# Patient Record
Sex: MALE | Race: WHITE | NOT HISPANIC OR LATINO | Employment: OTHER | URBAN - METROPOLITAN AREA
[De-identification: names, ages, dates, MRNs, and addresses within clinical notes are randomized per-mention and may not be internally consistent; named-entity substitution may affect disease eponyms.]

---

## 2021-03-20 ENCOUNTER — APPOINTMENT (EMERGENCY)
Dept: CT IMAGING | Facility: HOSPITAL | Age: 66
End: 2021-03-20
Payer: COMMERCIAL

## 2021-03-20 ENCOUNTER — HOSPITAL ENCOUNTER (EMERGENCY)
Facility: HOSPITAL | Age: 66
End: 2021-03-20
Attending: EMERGENCY MEDICINE | Admitting: EMERGENCY MEDICINE
Payer: COMMERCIAL

## 2021-03-20 ENCOUNTER — APPOINTMENT (EMERGENCY)
Dept: RADIOLOGY | Facility: HOSPITAL | Age: 66
End: 2021-03-20
Payer: COMMERCIAL

## 2021-03-20 ENCOUNTER — HOSPITAL ENCOUNTER (OUTPATIENT)
Facility: HOSPITAL | Age: 66
Setting detail: OBSERVATION
Discharge: HOME | End: 2021-03-21
Attending: SURGERY | Admitting: SURGERY
Payer: COMMERCIAL

## 2021-03-20 VITALS
SYSTOLIC BLOOD PRESSURE: 162 MMHG | DIASTOLIC BLOOD PRESSURE: 88 MMHG | TEMPERATURE: 98.3 F | OXYGEN SATURATION: 97 % | WEIGHT: 190 LBS | RESPIRATION RATE: 41 BRPM | HEART RATE: 70 BPM

## 2021-03-20 DIAGNOSIS — R07.81 RIB PAIN: ICD-10-CM
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
## 2021-03-20 DIAGNOSIS — R10.9 RIGHT SIDED ABDOMINAL PAIN: ICD-10-CM

## 2021-03-20 DIAGNOSIS — R07.89 CHEST WALL PAIN: ICD-10-CM

## 2021-03-20 DIAGNOSIS — S22.39XA RIB FRACTURE: Primary | ICD-10-CM

## 2021-03-20 LAB
ANION GAP SERPL CALCULATED.3IONS-SCNC: 13 MMOL/L (ref 4–13)
APTT PPP: 25 SECONDS (ref 23–37)
BASOPHILS # BLD AUTO: 0 THOUSANDS/ΜL (ref 0–0.1)
BASOPHILS NFR BLD AUTO: 1 % (ref 0–2)
BUN SERPL-MCNC: 21 MG/DL (ref 7–25)
CALCIUM SERPL-MCNC: 8.8 MG/DL (ref 8.6–10.5)
CHLORIDE SERPL-SCNC: 102 MMOL/L (ref 98–107)
CO2 SERPL-SCNC: 22 MMOL/L (ref 21–31)
CREAT SERPL-MCNC: 1.48 MG/DL (ref 0.7–1.3)
EOSINOPHIL # BLD AUTO: 0 THOUSAND/ΜL (ref 0–0.61)
EOSINOPHIL NFR BLD AUTO: 0 % (ref 0–5)
ERYTHROCYTE [DISTWIDTH] IN BLOOD BY AUTOMATED COUNT: 13.5 % (ref 11.5–14.5)
GFR SERPL CREATININE-BSD FRML MDRD: 49 ML/MIN/1.73SQ M
GLUCOSE SERPL-MCNC: 166 MG/DL (ref 65–99)
HCT VFR BLD AUTO: 40 % (ref 42–47)
HGB BLD-MCNC: 14.1 G/DL (ref 14–18)
LYMPHOCYTES # BLD AUTO: 1 THOUSANDS/ΜL (ref 0.6–4.47)
LYMPHOCYTES NFR BLD AUTO: 10 % (ref 21–51)
MCH RBC QN AUTO: 33.7 PG (ref 26–34)
MCHC RBC AUTO-ENTMCNC: 35.2 G/DL (ref 31–37)
MCV RBC AUTO: 96 FL (ref 81–99)
MONOCYTES # BLD AUTO: 1.1 THOUSAND/ΜL (ref 0.17–1.22)
MONOCYTES NFR BLD AUTO: 12 % (ref 2–12)
NEUTROPHILS # BLD AUTO: 7.2 THOUSANDS/ΜL (ref 1.4–6.5)
NEUTS SEG NFR BLD AUTO: 77 % (ref 42–75)
PLATELET # BLD AUTO: 205 THOUSANDS/UL (ref 149–390)
PMV BLD AUTO: 7.9 FL (ref 8.6–11.7)
POTASSIUM SERPL-SCNC: 3.7 MMOL/L (ref 3.5–5.5)
RBC # BLD AUTO: 4.17 MILLION/UL (ref 4.3–5.9)
SODIUM SERPL-SCNC: 137 MMOL/L (ref 134–143)
TROPONIN I SERPL-MCNC: <0.03 NG/ML
WBC # BLD AUTO: 9.4 THOUSAND/UL (ref 4.8–10.8)

## 2021-03-20 PROCEDURE — 71260 CT THORAX DX C+: CPT

## 2021-03-20 PROCEDURE — 96375 TX/PRO/DX INJ NEW DRUG ADDON: CPT

## 2021-03-20 PROCEDURE — 99285 EMERGENCY DEPT VISIT HI MDM: CPT | Performed by: EMERGENCY MEDICINE

## 2021-03-20 PROCEDURE — 74177 CT ABD & PELVIS W/CONTRAST: CPT

## 2021-03-20 PROCEDURE — 84484 ASSAY OF TROPONIN QUANT: CPT | Performed by: EMERGENCY MEDICINE

## 2021-03-20 PROCEDURE — 93005 ELECTROCARDIOGRAM TRACING: CPT

## 2021-03-20 PROCEDURE — 96374 THER/PROPH/DIAG INJ IV PUSH: CPT

## 2021-03-20 PROCEDURE — 36415 COLL VENOUS BLD VENIPUNCTURE: CPT | Performed by: EMERGENCY MEDICINE

## 2021-03-20 PROCEDURE — 99219 PR INITIAL OBSERVATION CARE/DAY 50 MINUTES: CPT | Performed by: SURGERY

## 2021-03-20 PROCEDURE — 85025 COMPLETE CBC W/AUTO DIFF WBC: CPT | Performed by: EMERGENCY MEDICINE

## 2021-03-20 PROCEDURE — 99285 EMERGENCY DEPT VISIT HI MDM: CPT

## 2021-03-20 PROCEDURE — 80048 BASIC METABOLIC PNL TOTAL CA: CPT | Performed by: EMERGENCY MEDICINE

## 2021-03-20 PROCEDURE — 70450 CT HEAD/BRAIN W/O DYE: CPT

## 2021-03-20 PROCEDURE — 96376 TX/PRO/DX INJ SAME DRUG ADON: CPT

## 2021-03-20 PROCEDURE — 72125 CT NECK SPINE W/O DYE: CPT

## 2021-03-20 PROCEDURE — 85730 THROMBOPLASTIN TIME PARTIAL: CPT | Performed by: EMERGENCY MEDICINE

## 2021-03-20 PROCEDURE — 71045 X-RAY EXAM CHEST 1 VIEW: CPT

## 2021-03-20 RX ORDER — ONDANSETRON 2 MG/ML
4 INJECTION INTRAMUSCULAR; INTRAVENOUS EVERY 6 HOURS PRN
Status: DISCONTINUED | OUTPATIENT
Start: 2021-03-20 | End: 2021-03-21 | Stop reason: HOSPADM

## 2021-03-20 RX ORDER — HYDROMORPHONE HCL/PF 1 MG/ML
0.5 SYRINGE (ML) INJECTION
Status: DISCONTINUED | OUTPATIENT
Start: 2021-03-20 | End: 2021-03-21 | Stop reason: HOSPADM

## 2021-03-20 RX ORDER — OXYCODONE HYDROCHLORIDE 5 MG/1
5 TABLET ORAL EVERY 4 HOURS PRN
Status: DISCONTINUED | OUTPATIENT
Start: 2021-03-20 | End: 2021-03-21 | Stop reason: HOSPADM

## 2021-03-20 RX ORDER — FENTANYL CITRATE 50 UG/ML
50 INJECTION, SOLUTION INTRAMUSCULAR; INTRAVENOUS ONCE
Status: COMPLETED | OUTPATIENT
Start: 2021-03-20 | End: 2021-03-20

## 2021-03-20 RX ORDER — ACETAMINOPHEN 325 MG/1
975 TABLET ORAL EVERY 8 HOURS SCHEDULED
Status: DISCONTINUED | OUTPATIENT
Start: 2021-03-20 | End: 2021-03-21 | Stop reason: HOSPADM

## 2021-03-20 RX ORDER — NICOTINE 21 MG/24HR
1 PATCH, TRANSDERMAL 24 HOURS TRANSDERMAL DAILY
Status: DISCONTINUED | OUTPATIENT
Start: 2021-03-21 | End: 2021-03-21 | Stop reason: HOSPADM

## 2021-03-20 RX ORDER — GABAPENTIN 300 MG/1
300 CAPSULE ORAL
Status: DISCONTINUED | OUTPATIENT
Start: 2021-03-20 | End: 2021-03-21 | Stop reason: HOSPADM

## 2021-03-20 RX ORDER — METHOCARBAMOL 500 MG/1
500 TABLET, FILM COATED ORAL EVERY 6 HOURS SCHEDULED
Status: DISCONTINUED | OUTPATIENT
Start: 2021-03-20 | End: 2021-03-21 | Stop reason: HOSPADM

## 2021-03-20 RX ORDER — LIDOCAINE 50 MG/G
1 PATCH TOPICAL DAILY
Status: DISCONTINUED | OUTPATIENT
Start: 2021-03-21 | End: 2021-03-21 | Stop reason: HOSPADM

## 2021-03-20 RX ORDER — SENNOSIDES 8.6 MG
2 TABLET ORAL DAILY
Status: DISCONTINUED | OUTPATIENT
Start: 2021-03-21 | End: 2021-03-21 | Stop reason: HOSPADM

## 2021-03-20 RX ORDER — HYDROMORPHONE HCL/PF 1 MG/ML
0.5 SYRINGE (ML) INJECTION ONCE
Status: COMPLETED | OUTPATIENT
Start: 2021-03-20 | End: 2021-03-20

## 2021-03-20 RX ORDER — HYDROMORPHONE HCL/PF 1 MG/ML
1 SYRINGE (ML) INJECTION ONCE
Status: COMPLETED | OUTPATIENT
Start: 2021-03-20 | End: 2021-03-20

## 2021-03-20 RX ORDER — OXYCODONE HYDROCHLORIDE 10 MG/1
10 TABLET ORAL EVERY 4 HOURS PRN
Status: DISCONTINUED | OUTPATIENT
Start: 2021-03-20 | End: 2021-03-21 | Stop reason: HOSPADM

## 2021-03-20 RX ADMIN — METHOCARBAMOL 500 MG: 500 TABLET, FILM COATED ORAL at 17:42

## 2021-03-20 RX ADMIN — HYDROMORPHONE HYDROCHLORIDE 0.5 MG: 1 INJECTION, SOLUTION INTRAMUSCULAR; INTRAVENOUS; SUBCUTANEOUS at 20:03

## 2021-03-20 RX ADMIN — HYDROMORPHONE HYDROCHLORIDE 1 MG: 1 INJECTION, SOLUTION INTRAMUSCULAR; INTRAVENOUS; SUBCUTANEOUS at 14:36

## 2021-03-20 RX ADMIN — ENOXAPARIN SODIUM 30 MG: 30 INJECTION SUBCUTANEOUS at 19:11

## 2021-03-20 RX ADMIN — ACETAMINOPHEN 975 MG: 325 TABLET, FILM COATED ORAL at 17:41

## 2021-03-20 RX ADMIN — FENTANYL CITRATE 50 MCG: 50 INJECTION INTRAMUSCULAR; INTRAVENOUS at 13:41

## 2021-03-20 RX ADMIN — FENTANYL CITRATE 50 MCG: 50 INJECTION INTRAMUSCULAR; INTRAVENOUS at 13:45

## 2021-03-20 RX ADMIN — GABAPENTIN 300 MG: 300 CAPSULE ORAL at 22:24

## 2021-03-20 RX ADMIN — ACETAMINOPHEN 975 MG: 325 TABLET, FILM COATED ORAL at 22:24

## 2021-03-20 RX ADMIN — METHOCARBAMOL 500 MG: 500 TABLET, FILM COATED ORAL at 22:24

## 2021-03-20 RX ADMIN — IOHEXOL 100 ML: 350 INJECTION, SOLUTION INTRAVENOUS at 13:09

## 2021-03-20 RX ADMIN — OXYCODONE HYDROCHLORIDE 10 MG: 10 TABLET ORAL at 23:29

## 2021-03-20 RX ADMIN — FENTANYL CITRATE 50 MCG: 50 INJECTION INTRAMUSCULAR; INTRAVENOUS at 14:27

## 2021-03-20 RX ADMIN — HYDROMORPHONE HYDROCHLORIDE 0.5 MG: 1 INJECTION, SOLUTION INTRAMUSCULAR; INTRAVENOUS; SUBCUTANEOUS at 15:44

## 2021-03-20 RX ADMIN — ONDANSETRON 4 MG: 2 INJECTION INTRAMUSCULAR; INTRAVENOUS at 20:09

## 2021-03-20 RX ADMIN — OXYCODONE HYDROCHLORIDE 10 MG: 10 TABLET ORAL at 19:11

## 2021-03-20 NOTE — RESPIRATORY THERAPY NOTE
RT Protocol Note  Jocelynn Louder 76 y o  male MRN: [id number]  Unit/Bed#: ED 01 Encounter: [id number]    Assessment    Active Problems:    * No active hospital problems  *      Home Pulmonary Medications:  na       Past Medical History:   Diagnosis Date    Ruptured, tendon, Achilles     Shoulder injury      Social History     Socioeconomic History    Marital status: /Civil Union     Spouse name: None    Number of children: None    Years of education: None    Highest education level: None   Occupational History    None   Social Needs    Financial resource strain: None    Food insecurity     Worry: None     Inability: None    Transportation needs     Medical: None     Non-medical: None   Tobacco Use    Smoking status: Current Some Day Smoker    Smokeless tobacco: Never Used   Substance and Sexual Activity    Alcohol use: Yes    Drug use: Never    Sexual activity: None   Lifestyle    Physical activity     Days per week: None     Minutes per session: None    Stress: None   Relationships    Social connections     Talks on phone: None     Gets together: None     Attends [employer] service: None     Active member of club or organization: None     Attends meetings of clubs or organizations: None     Relationship status: None    Intimate partner violence     Fear of current or ex partner: None     Emotionally abused: None     Physically abused: None     Forced sexual activity: None   Other Topics Concern    None   Social History Narrative    None       Subjective         Objective    Physical Exam:   Assessment Type: (P) Assess only  General Appearance: (P) Alert, Awake  Respiratory Pattern: (P) Normal  Chest Assessment: (P) Chest expansion symmetrical  Bilateral Breath Sounds: (P) Clear  Cough: (P) None  O2 Device: (P) na    Vitals:  Blood pressure 159/90, pulse 71, temperature 97 9 °F (36 6 °C), temperature source Oral, resp  rate 20, SpO2 98 %            Imaging and other studies: I have personally reviewed pertinent reports        O2 Device: (P) na     Plan       Airway Clearance Plan: (P) Incentive Spirometer     Resp Comments: (P) pt instructed on IS, tolerated well, will continue to monitor per protocol
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
## 2021-03-20 NOTE — H&P
H&P Exam - Trauma   Darius Briceno 76 y o  male MRN: [id number]  Unit/Bed#: ED 01 Encounter: [id number]    Assessment/Plan   Trauma Alert: Other Transfer  Model of Arrival: Ambulance  Trauma Team: Attending Trey Ramos and Residents Check  Consultants: None    Trauma Active Problems:   Skiing accident  Chest wall pain  Right-sided abdominal pain    Trauma Plan:   CT head, C-spine, CAP performed prior to transfer  Admit to med surg  Analgesia  Serial exams    Chief Complaint:  Right upper quadrant pain    History of Present Illness   HPI:  Darius Briceno is a 76 y o  male with no significant past medical history presents as a transfer from [facility] for intractable pain  Patient was skiing when he was struck by another skier  He was wearing his helmet  Denies any loss of consciousness  He was complaining primarily of right-sided abdominal pain and chest wall pain  CT scan demonstrated slightly bowed appeared to the right anterior 2nd rib without fracture line seen  Given tachypnea and requirement of IV narcotics, patient transferred here for further management and care  He denies any headache, vision changes, neck or back pain  No shortness of breath, abdominal pain, nausea or vomiting  Mechanism:Fall    Review of Systems    12-point, complete review of systems was reviewed and negative except as stated above         Historical Information     Past Medical History:   Diagnosis Date    Ruptured, tendon, Achilles     Shoulder injury      Past Surgical History:   Procedure Laterality Date    ACHILLES TENDON REPAIR      THYROIDECTOMY, PARTIAL       Social History   Social History     Substance and Sexual Activity   Alcohol Use Yes     Social History     Substance and Sexual Activity   Drug Use Never     Social History     Tobacco Use   Smoking Status Current Some Day Smoker   Smokeless Tobacco Never Used     E-Cigarette/Vaping    E-Cigarette Use Never User      E-Cigarette/Vaping Substances There is no immunization history on file for this patient  Family History: Non-contributory        Meds/Allergies   all current active meds have been reviewed    No Known Allergies      PHYSICAL EXAM    Objective   Vitals:   First set: Temperature: 97 9 °F (36 6 °C) (03/20/21 1655)  Pulse: 71 (03/20/21 1655)  Respirations: 20 (03/20/21 1655)  Blood Pressure: 159/90 (03/20/21 1655)    Primary Survey:   (A) Airway:  Intact  (B) Breathing:  Bilateral breath sounds, equal chest rise  (C) Circulation: Pulses:   normal  (D) Disabliity:  GCS Total:  15  (E) Expose:  Completed    Secondary Survey: (Click on Physical Exam tab above)  Physical Exam  Vitals signs and nursing note reviewed  Constitutional:       Appearance: He is well-developed  HENT:      Head: Normocephalic and atraumatic  Right Ear: External ear normal       Left Ear: External ear normal       Nose: Nose normal       Mouth/Throat:      Mouth: Mucous membranes are moist    Eyes:      Extraocular Movements: Extraocular movements intact  Conjunctiva/sclera: Conjunctivae normal       Pupils: Pupils are equal, round, and reactive to light  Neck:      Musculoskeletal: Normal range of motion and neck supple  Comments: No neck tenderness  Cardiovascular:      Rate and Rhythm: Normal rate and regular rhythm  Heart sounds: No murmur  Comments: Right chest wall tenderness  Pulmonary:      Effort: Pulmonary effort is normal  No respiratory distress  Breath sounds: Normal breath sounds  Abdominal:      General: Bowel sounds are normal       Palpations: Abdomen is soft  Tenderness: There is abdominal tenderness (Right upper quadrant)  Musculoskeletal: Normal range of motion  General: No tenderness or deformity  Comments: No midline back tenderness, no step-offs   Skin:     General: Skin is warm and dry  Capillary Refill: Capillary refill takes less than 2 seconds     Neurological:      General: No focal deficit present  Mental Status: He is alert and oriented to person, place, and time  Cranial Nerves: No cranial nerve deficit  Sensory: No sensory deficit  Psychiatric:         Mood and Affect: Mood normal          Behavior: Behavior normal          Invasive Devices     None                 Lab Results:   BMP/CMP:   Lab Results   Component Value Date    SODIUM 137 03/20/2021    K 3 7 03/20/2021     03/20/2021    CO2 22 03/20/2021    BUN 21 03/20/2021    CREATININE 1 48 (H) 03/20/2021    CALCIUM 8 8 03/20/2021    EGFR 49 03/20/2021    and CBC:   Lab Results   Component Value Date    WBC 9 40 03/20/2021    HGB 14 1 03/20/2021    HCT 40 0 (L) 03/20/2021    MCV 96 03/20/2021     03/20/2021    MCH 33 7 03/20/2021    MCHC 35 2 03/20/2021    RDW 13 5 03/20/2021    MPV 7 9 (L) 03/20/2021     Imaging/EKG Studies: Results: I have personally reviewed pertinent reports        Code Status:  Full code   Advance Directive and Living Will:      Power of :    POLST:

## 2021-03-21 VITALS
HEART RATE: 52 BPM | BODY MASS INDEX: 28.14 KG/M2 | TEMPERATURE: 98.6 F | WEIGHT: 190 LBS | RESPIRATION RATE: 16 BRPM | OXYGEN SATURATION: 97 % | HEIGHT: 69 IN | DIASTOLIC BLOOD PRESSURE: 82 MMHG | SYSTOLIC BLOOD PRESSURE: 142 MMHG

## 2021-03-21 PROBLEM — R93.89 ABNORMAL FINDING ON CT SCAN: Status: ACTIVE | Noted: 2021-03-21

## 2021-03-21 PROBLEM — R07.81 RIB PAIN: Status: ACTIVE | Noted: 2021-03-21

## 2021-03-21 LAB
ANION GAP SERPL CALCULATED.3IONS-SCNC: 4 MMOL/L (ref 4–13)
ATRIAL RATE: 83 BPM
BASOPHILS # BLD AUTO: 0.02 THOUSANDS/ΜL (ref 0–0.1)
BASOPHILS NFR BLD AUTO: 0 % (ref 0–1)
BUN SERPL-MCNC: 19 MG/DL (ref 5–25)
CALCIUM SERPL-MCNC: 8.5 MG/DL (ref 8.3–10.1)
CHLORIDE SERPL-SCNC: 110 MMOL/L (ref 100–108)
CO2 SERPL-SCNC: 26 MMOL/L (ref 21–32)
CREAT SERPL-MCNC: 1.18 MG/DL (ref 0.6–1.3)
EOSINOPHIL # BLD AUTO: 0 THOUSAND/ΜL (ref 0–0.61)
EOSINOPHIL NFR BLD AUTO: 0 % (ref 0–6)
ERYTHROCYTE [DISTWIDTH] IN BLOOD BY AUTOMATED COUNT: 12.6 % (ref 11.6–15.1)
GFR SERPL CREATININE-BSD FRML MDRD: 64 ML/MIN/1.73SQ M
GLUCOSE SERPL-MCNC: 106 MG/DL (ref 65–140)
HCT VFR BLD AUTO: 37.3 % (ref 36.5–49.3)
HGB BLD-MCNC: 13 G/DL (ref 12–17)
IMM GRANULOCYTES # BLD AUTO: 0.03 THOUSAND/UL (ref 0–0.2)
IMM GRANULOCYTES NFR BLD AUTO: 0 % (ref 0–2)
LYMPHOCYTES # BLD AUTO: 0.85 THOUSANDS/ΜL (ref 0.6–4.47)
LYMPHOCYTES NFR BLD AUTO: 10 % (ref 14–44)
MCH RBC QN AUTO: 33.7 PG (ref 26.8–34.3)
MCHC RBC AUTO-ENTMCNC: 34.9 G/DL (ref 31.4–37.4)
MCV RBC AUTO: 97 FL (ref 82–98)
MONOCYTES # BLD AUTO: 1.26 THOUSAND/ΜL (ref 0.17–1.22)
MONOCYTES NFR BLD AUTO: 14 % (ref 4–12)
NEUTROPHILS # BLD AUTO: 6.69 THOUSANDS/ΜL (ref 1.85–7.62)
NEUTS SEG NFR BLD AUTO: 76 % (ref 43–75)
NRBC BLD AUTO-RTO: 0 /100 WBCS
PLATELET # BLD AUTO: 200 THOUSANDS/UL (ref 149–390)
PMV BLD AUTO: 9.7 FL (ref 8.9–12.7)
POTASSIUM SERPL-SCNC: 3.3 MMOL/L (ref 3.5–5.3)
QRS AXIS: 3 DEGREES
QRSD INTERVAL: 88 MS
QT INTERVAL: 408 MS
QTC INTERVAL: 476 MS
RBC # BLD AUTO: 3.86 MILLION/UL (ref 3.88–5.62)
SODIUM SERPL-SCNC: 140 MMOL/L (ref 136–145)
T WAVE AXIS: -24 DEGREES
VENTRICULAR RATE: 82 BPM
WBC # BLD AUTO: 8.85 THOUSAND/UL (ref 4.31–10.16)

## 2021-03-21 PROCEDURE — 94760 N-INVAS EAR/PLS OXIMETRY 1: CPT

## 2021-03-21 PROCEDURE — 85025 COMPLETE CBC W/AUTO DIFF WBC: CPT | Performed by: EMERGENCY MEDICINE

## 2021-03-21 PROCEDURE — 97162 PT EVAL MOD COMPLEX 30 MIN: CPT

## 2021-03-21 PROCEDURE — 97166 OT EVAL MOD COMPLEX 45 MIN: CPT

## 2021-03-21 PROCEDURE — 80048 BASIC METABOLIC PNL TOTAL CA: CPT | Performed by: EMERGENCY MEDICINE

## 2021-03-21 PROCEDURE — 99217 PR OBSERVATION CARE DISCHARGE MANAGEMENT: CPT | Performed by: PHYSICIAN ASSISTANT

## 2021-03-21 PROCEDURE — NC001 PR NO CHARGE: Performed by: SURGERY

## 2021-03-21 PROCEDURE — 93010 ELECTROCARDIOGRAM REPORT: CPT | Performed by: INTERNAL MEDICINE

## 2021-03-21 RX ORDER — METHOCARBAMOL 500 MG/1
500 TABLET, FILM COATED ORAL EVERY 6 HOURS SCHEDULED
Qty: 45 TABLET | Refills: 0 | Status: SHIPPED | OUTPATIENT
Start: 2021-03-21

## 2021-03-21 RX ORDER — DOCUSATE SODIUM 100 MG/1
100 CAPSULE, LIQUID FILLED ORAL 2 TIMES DAILY
Qty: 10 CAPSULE | Refills: 0 | Status: SHIPPED | OUTPATIENT
Start: 2021-03-21

## 2021-03-21 RX ORDER — GABAPENTIN 300 MG/1
300 CAPSULE ORAL
Qty: 30 CAPSULE | Refills: 0 | Status: SHIPPED | OUTPATIENT
Start: 2021-03-21

## 2021-03-21 RX ORDER — GABAPENTIN 300 MG/1
300 CAPSULE ORAL
Qty: 30 CAPSULE | Refills: 0 | Status: SHIPPED | OUTPATIENT
Start: 2021-03-21 | End: 2021-03-21

## 2021-03-21 RX ORDER — METHOCARBAMOL 500 MG/1
500 TABLET, FILM COATED ORAL EVERY 6 HOURS SCHEDULED
Qty: 45 TABLET | Refills: 0 | Status: SHIPPED | OUTPATIENT
Start: 2021-03-21 | End: 2021-03-21

## 2021-03-21 RX ORDER — OXYCODONE HYDROCHLORIDE 5 MG/1
TABLET ORAL
Qty: 30 TABLET | Refills: 0 | Status: SHIPPED | OUTPATIENT
Start: 2021-03-21

## 2021-03-21 RX ORDER — SIMETHICONE 80 MG
80 TABLET,CHEWABLE ORAL EVERY 6 HOURS PRN
Status: DISCONTINUED | OUTPATIENT
Start: 2021-03-21 | End: 2021-03-21 | Stop reason: HOSPADM

## 2021-03-21 RX ORDER — ACETAMINOPHEN 325 MG/1
975 TABLET ORAL EVERY 8 HOURS SCHEDULED
Refills: 0
Start: 2021-03-21

## 2021-03-21 RX ORDER — SENNOSIDES 8.6 MG
17.2 TABLET ORAL DAILY
Refills: 0
Start: 2021-03-22

## 2021-03-21 RX ADMIN — ACETAMINOPHEN 975 MG: 325 TABLET, FILM COATED ORAL at 13:50

## 2021-03-21 RX ADMIN — SENNOSIDES 17.2 MG: 8.6 TABLET, FILM COATED ORAL at 08:47

## 2021-03-21 RX ADMIN — SIMETHICONE 80 MG: 80 TABLET, CHEWABLE ORAL at 11:31

## 2021-03-21 RX ADMIN — HYDROMORPHONE HYDROCHLORIDE 0.5 MG: 1 INJECTION, SOLUTION INTRAMUSCULAR; INTRAVENOUS; SUBCUTANEOUS at 00:34

## 2021-03-21 RX ADMIN — ENOXAPARIN SODIUM 30 MG: 30 INJECTION SUBCUTANEOUS at 08:47

## 2021-03-21 RX ADMIN — ACETAMINOPHEN 975 MG: 325 TABLET, FILM COATED ORAL at 05:18

## 2021-03-21 RX ADMIN — METHOCARBAMOL 500 MG: 500 TABLET, FILM COATED ORAL at 05:18

## 2021-03-21 RX ADMIN — METHOCARBAMOL 500 MG: 500 TABLET, FILM COATED ORAL at 11:31

## 2021-03-21 RX ADMIN — LIDOCAINE 1 PATCH: 50 PATCH TOPICAL at 08:46

## 2021-03-21 NOTE — PHYSICAL THERAPY NOTE
Physical Therapy Evaluation    Patient's Name: Nilda Tejeda    Admitting Diagnosis  Rib pain [R07 81]    Problem List  Patient Active Problem List   Diagnosis    Rib pain    Abnormal finding on CT scan       Past Medical History  Past Medical History:   Diagnosis Date    Ruptured, tendon, Achilles     Shoulder injury        Past Surgical History  Past Surgical History:   Procedure Laterality Date    ACHILLES TENDON REPAIR      THYROIDECTOMY, PARTIAL          03/21/21 0954   PT Last Visit   PT Visit Date 03/21/21   Note Type   Note type Evaluation   Pain Assessment   Pain Assessment Tool FLACC   Pain Location/Orientation Orientation: Right;Location: Rib Cage   Hospital Pain Intervention(s) Repositioned; Ambulation/increased activity   Pain Rating: FLACC (Rest) - Face 0   Pain Rating: FLACC (Rest) - Legs 0   Pain Rating: FLACC (Rest) - Activity 0   Pain Rating: FLACC (Rest) - Cry 0   Pain Rating: FLACC (Rest) - Consolability 0   Score: FLACC (Rest) 0   Pain Rating: FLACC (Activity) - Face 1   Pain Rating: FLACC (Activity) - Legs 0   Pain Rating: FLACC (Activity) - Activity 0   Pain Rating: FLACC (Activity) - Cry 0   Pain Rating: FLACC (Activity) - Consolability 0   Score: FLACC (Activity) 1   Home Living   Type of [address] [place] [address] One level;Stairs to enter with rails   Home Equipment Other (Comment)  (none)   Additional Comments Pt lives in a [facility] with 1 ROMERO   Prior Function   Level of [place] Independent with ADLs and functional mobility   Lives With Spouse   ADL Assistance Independent   IADLs Independent   Falls in the last 6 months 0   Vocational Retired   Restrictions/Precautions   [place] Bearing Precautions Per Order No   Other Precautions Pain   Cognition   Overall Cognitive Status WFL   Orientation Level Oriented X4   Memory Within functional limits   Following Commands Follows all commands and directions without difficulty   RLE Assessment   RLE Assessment WFL   LLE Assessment   LLE Assessment WFL   Bed Mobility   Supine to Sit 6  Modified independent   Sit to Supine 6  Modified independent   Transfers   Sit to Stand 5  Supervision   Stand to Sit 5  Supervision   Additional Comments Transfers w/o AD   Ambulation/Elevation   Gait pattern Inconsistent al; Step to   Gait Assistance 5  Supervision   Additional items Assist x 1   Assistive Device None   Distance 360ft   Balance   Static Sitting Good   Dynamic Sitting Fair +   Static Standing Fair   Dynamic Standing [address],Flo[address] 3,4, & 5 -   Activity Tolerance   Activity Tolerance Patient tolerated treatment well   Medical Staff Made Aware OT Tramainebrennen   Nurse Made Aware pt ok to see per RN   Assessment   Prognosis Good   Assessment Pt is a 76 y o  male seen for PT evaluation s/p admit to [facility] on 3/20/2021  Pt was admitted with a primary dx of: rib pain after being struck by a snowboarder  CT scan showed "bowing of his R 2nd rib " CT of head, c-spine, and chest were negative  PT now consulted for assessment of mobility and d/c needs  Pt with Up in chair, PT evaluation orders  Pts current comorbidities effecting treatment include: hx of ruptured achilles and shoulder injury  Social factors impacting patient include: advanced age, stair navigation at home  Pts current clinical presentation is Evolving (medium complexity) due to Ongoing medical management for primary dx, Decreased activity tolerance compared to baseline, Fall risk  Prior to admission, pt was independent with ADLs and ambulating w/o AD  Pt lives with his wife in a [facility] with 1STE  Upon evaluation, pt currently is mod I for bed mobility; supervision for transfers and supervision for ambulation 360 ft w/ no AD  Pt presents at PT eval functioning at/near baseline mobility level, limited mostly by pain  No further acute PT needs, D/C PT  At conclusion of PT session pt returned BTB with phone and call bell within reach  Pt denies any further questions at this time   The patient's AM-PAC Basic Mobility Inpatient Short Form Raw Score is 20, Standardized Score is 43 99  A standardized score greater than 42 9 suggests the patient may benefit from discharge to home  Please also refer to the recommendation of the Physical Therapist for safe discharge planning  Recommend home with social support upon hospital D/C     Barriers to Discharge None   Goals   Patient Goals to go home   Plan   PT Frequency One time visit  (D/C PT)   Recommendation   PT Discharge Recommendation Return to previous environment with social support   PT - OK to Discharge Yes  (when medically cleared)   AM-PAC Basic Mobility Inpatient   Turning in Bed Without Bedrails 4   Lying on Back to Sitting on Edge of Flat Bed 3   Moving Bed to Chair 4   Standing Up From Chair 3   Walk in Room 3   Climb 3-5 Stairs 3   Basic Mobility Inpatient Raw Score 20   Basic Mobility Standardized Score 43 99       Britany Dyson, PT, DPT

## 2021-03-21 NOTE — PLAN OF CARE
Problem: Potential for Falls  Goal: Patient will remain free of falls  Description: INTERVENTIONS:  - Assess patient frequently for physical needs  -  Identify cognitive and physical deficits and behaviors that affect risk of falls    -  [place] fall precautions as indicated by assessment   - Educate patient/family on patient safety including physical limitations  - Instruct patient to call for assistance with activity based on assessment  - Modify environment to reduce risk of injury  - Consider OT/PT consult to assist with strengthening/mobility  Outcome: Progressing     Problem: PAIN - ADULT  Goal: Verbalizes/displays adequate comfort level or baseline comfort level  Description: Interventions:  - Encourage patient to monitor pain and request assistance  - Assess pain using appropriate pain scale  - Administer analgesics based on type and severity of pain and evaluate response  - Implement non-pharmacological measures as appropriate and evaluate response  - Consider cultural and social influences on pain and pain management  - Notify physician/advanced practitioner if interventions unsuccessful or patient reports new pain  Outcome: Progressing     Problem: INFECTION - ADULT  Goal: Absence or prevention of progression during hospitalization  Description: INTERVENTIONS:  - Assess and monitor for signs and symptoms of infection  - Monitor lab/diagnostic results  - Monitor all insertion sites, i e  indwelling lines, tubes, and drains  - Monitor endotracheal if appropriate and nasal secretions for changes in amount and color  - [place] appropriate cooling/warming therapies per order  - Administer medications as ordered  - Instruct and encourage patient and family to use good hand hygiene technique  - Identify and instruct in appropriate isolation precautions for identified infection/condition  Outcome: Progressing  Goal: Absence of fever/infection during neutropenic period  Description: INTERVENTIONS:  - Monitor WBC    Outcome: Progressing     Problem: SAFETY ADULT  Goal: Patient will remain free of falls  Description: INTERVENTIONS:  - Assess patient frequently for physical needs  -  Identify cognitive and physical deficits and behaviors that affect risk of falls    -  [place] fall precautions as indicated by assessment   - Educate patient/family on patient safety including physical limitations  - Instruct patient to call for assistance with activity based on assessment  - Modify environment to reduce risk of injury  - Consider OT/PT consult to assist with strengthening/mobility  Outcome: Progressing  Goal: Maintain or return to baseline ADL function  Description: INTERVENTIONS:  -  Assess patient's ability to carry out ADLs; assess patient's baseline for ADL function and identify physical deficits which impact ability to perform ADLs (bathing, care of mouth/teeth, toileting, grooming, dressing, etc )  - Assess/evaluate cause of self-care deficits   - Assess range of motion  - Assess patient's mobility; develop plan if impaired  - Assess patient's need for assistive devices and provide as appropriate  - Encourage maximum independence but intervene and supervise when necessary  - Involve family in performance of ADLs  - Assess for home care needs following discharge   - Consider OT consult to assist with ADL evaluation and planning for discharge  - Provide patient education as appropriate  Outcome: Progressing  Goal: Maintain or return mobility status to optimal level  Description: INTERVENTIONS:  - Assess patient's baseline mobility status (ambulation, transfers, stairs, etc )    - Identify cognitive and physical deficits and behaviors that affect mobility  - Identify mobility aids required to assist with transfers and/or ambulation (gait belt, sit-to-stand, lift, walker, cane, etc )  - [place] fall precautions as indicated by assessment  - Record patient progress and toleration of activity level on Mobility SBAR; progress patient to next Phase/Stage  - Instruct patient to call for assistance with activity based on assessment  - Consider rehabilitation consult to assist with strengthening/weightbearing, etc   Outcome: Progressing     Problem: PAIN - ADULT  Goal: Verbalizes/displays adequate comfort level or baseline comfort level  Description: Interventions:  - Encourage patient to monitor pain and request assistance  - Assess pain using appropriate pain scale  - Administer analgesics based on type and severity of pain and evaluate response  - Implement non-pharmacological measures as appropriate and evaluate response  - Consider cultural and social influences on pain and pain management  - Notify physician/advanced practitioner if interventions unsuccessful or patient reports new pain  Outcome: Progressing     Problem: DISCHARGE PLANNING  Goal: Discharge to home or other facility with appropriate resources  Description: INTERVENTIONS:  - Identify barriers to discharge w/patient and caregiver  - Arrange for needed discharge resources and transportation as appropriate  - Identify discharge learning needs (meds, wound care, etc )  - Arrange for interpretive services to assist at discharge as needed  - Refer to Case Management Department for coordinating discharge planning if the patient needs post-hospital services based on physician/advanced practitioner order or complex needs related to functional status, cognitive ability, or social support system  Outcome: Progressing     Problem: Knowledge Deficit  Goal: Patient/family/caregiver demonstrates understanding of disease process, treatment plan, medications, and discharge instructions  Description: Complete learning assessment and assess knowledge base    Interventions:  - Provide teaching at level of understanding  - Provide teaching via preferred learning methods  Outcome: Progressing     Problem: RESPIRATORY - ADULT  Goal: Achieves optimal ventilation and oxygenation  Description: INTERVENTIONS:  - Assess for changes in respiratory status  - Assess for changes in mentation and behavior  - Position to facilitate oxygenation and minimize respiratory effort  - Oxygen administered by appropriate delivery if ordered  - Initiate smoking cessation education as indicated  - Encourage broncho-pulmonary hygiene including cough, deep breathe, Incentive Spirometry  - Assess the need for suctioning and aspirate as needed  - Assess and instruct to report SOB or any respiratory difficulty  - Respiratory Therapy support as indicated  Outcome: Progressing     Problem: GASTROINTESTINAL - ADULT  Goal: Minimal or absence of nausea and/or vomiting  Description: INTERVENTIONS:  - Administer IV fluids if ordered to ensure adequate hydration  - Maintain NPO status until nausea and vomiting are resolved  - Nasogastric tube if ordered  - Administer ordered antiemetic medications as needed  - Provide nonpharmacologic comfort measures as appropriate  - Advance diet as tolerated, if ordered  - Consider nutrition services referral to assist patient with adequate nutrition and appropriate food choices  Outcome: Progressing  Goal: Maintains or returns to baseline bowel function  Description: INTERVENTIONS:  - Assess bowel function  - Encourage oral fluids to ensure adequate hydration  - Administer IV fluids if ordered to ensure adequate hydration  - Administer ordered medications as needed  - Encourage mobilization and activity  - Consider nutritional services referral to assist patient with adequate nutrition and appropriate food choices  Outcome: Progressing  Goal: Maintains adequate nutritional intake  Description: INTERVENTIONS:  - Monitor percentage of each meal consumed  - Identify factors contributing to decreased intake, treat as appropriate  - Assist with meals as needed  - Monitor I&O, weight, and lab values if indicated  - Obtain nutrition services referral as needed  Outcome: Progressing

## 2021-03-21 NOTE — PLAN OF CARE
Problem: Potential for Falls  Goal: Patient will remain free of falls  Description: INTERVENTIONS:  - Assess patient frequently for physical needs  -  Identify cognitive and physical deficits and behaviors that affect risk of falls    -  [place] fall precautions as indicated by assessment   - Educate patient/family on patient safety including physical limitations  - Instruct patient to call for assistance with activity based on assessment  - Modify environment to reduce risk of injury  - Consider OT/PT consult to assist with strengthening/mobility  Outcome: Progressing     Problem: PAIN - ADULT  Goal: Verbalizes/displays adequate comfort level or baseline comfort level  Description: Interventions:  - Encourage patient to monitor pain and request assistance  - Assess pain using appropriate pain scale  - Administer analgesics based on type and severity of pain and evaluate response  - Implement non-pharmacological measures as appropriate and evaluate response  - Consider cultural and social influences on pain and pain management  - Notify physician/advanced practitioner if interventions unsuccessful or patient reports new pain  Outcome: Progressing     Problem: INFECTION - ADULT  Goal: Absence or prevention of progression during hospitalization  Description: INTERVENTIONS:  - Assess and monitor for signs and symptoms of infection  - Monitor lab/diagnostic results  - Monitor all insertion sites, i e  indwelling lines, tubes, and drains  - Monitor endotracheal if appropriate and nasal secretions for changes in amount and color  - [place] appropriate cooling/warming therapies per order  - Administer medications as ordered  - Instruct and encourage patient and family to use good hand hygiene technique  - Identify and instruct in appropriate isolation precautions for identified infection/condition  Outcome: Progressing  Goal: Absence of fever/infection during neutropenic period  Description: INTERVENTIONS:  - Monitor WBC    Outcome: Progressing     Problem: SAFETY ADULT  Goal: Patient will remain free of falls  Description: INTERVENTIONS:  - Assess patient frequently for physical needs  -  Identify cognitive and physical deficits and behaviors that affect risk of falls    -  [place] fall precautions as indicated by assessment   - Educate patient/family on patient safety including physical limitations  - Instruct patient to call for assistance with activity based on assessment  - Modify environment to reduce risk of injury  - Consider OT/PT consult to assist with strengthening/mobility  Outcome: Progressing  Goal: Maintain or return to baseline ADL function  Description: INTERVENTIONS:  -  Assess patient's ability to carry out ADLs; assess patient's baseline for ADL function and identify physical deficits which impact ability to perform ADLs (bathing, care of mouth/teeth, toileting, grooming, dressing, etc )  - Assess/evaluate cause of self-care deficits   - Assess range of motion  - Assess patient's mobility; develop plan if impaired  - Assess patient's need for assistive devices and provide as appropriate  - Encourage maximum independence but intervene and supervise when necessary  - Involve family in performance of ADLs  - Assess for home care needs following discharge   - Consider OT consult to assist with ADL evaluation and planning for discharge  - Provide patient education as appropriate  Outcome: Progressing  Goal: Maintain or return mobility status to optimal level  Description: INTERVENTIONS:  - Assess patient's baseline mobility status (ambulation, transfers, stairs, etc )    - Identify cognitive and physical deficits and behaviors that affect mobility  - Identify mobility aids required to assist with transfers and/or ambulation (gait belt, sit-to-stand, lift, walker, cane, etc )  - [place] fall precautions as indicated by assessment  - Record patient progress and toleration of activity level on Mobility SBAR; progress patient to next Phase/Stage  - Instruct patient to call for assistance with activity based on assessment  - Consider rehabilitation consult to assist with strengthening/weightbearing, etc   Outcome: Progressing     Problem: DISCHARGE PLANNING  Goal: Discharge to home or other facility with appropriate resources  Description: INTERVENTIONS:  - Identify barriers to discharge w/patient and caregiver  - Arrange for needed discharge resources and transportation as appropriate  - Identify discharge learning needs (meds, wound care, etc )  - Arrange for interpretive services to assist at discharge as needed  - Refer to Case Management Department for coordinating discharge planning if the patient needs post-hospital services based on physician/advanced practitioner order or complex needs related to functional status, cognitive ability, or social support system  Outcome: Progressing     Problem: Knowledge Deficit  Goal: Patient/family/caregiver demonstrates understanding of disease process, treatment plan, medications, and discharge instructions  Description: Complete learning assessment and assess knowledge base    Interventions:  - Provide teaching at level of understanding  - Provide teaching via preferred learning methods  Outcome: Progressing     Problem: RESPIRATORY - ADULT  Goal: Achieves optimal ventilation and oxygenation  Description: INTERVENTIONS:  - Assess for changes in respiratory status  - Assess for changes in mentation and behavior  - Position to facilitate oxygenation and minimize respiratory effort  - Oxygen administered by appropriate delivery if ordered  - Initiate smoking cessation education as indicated  - Encourage broncho-pulmonary hygiene including cough, deep breathe, Incentive Spirometry  - Assess the need for suctioning and aspirate as needed  - Assess and instruct to report SOB or any respiratory difficulty  - Respiratory Therapy support as indicated  Outcome: Progressing     Problem: GASTROINTESTINAL - ADULT  Goal: Minimal or absence of nausea and/or vomiting  Description: INTERVENTIONS:  - Administer IV fluids if ordered to ensure adequate hydration  - Maintain NPO status until nausea and vomiting are resolved  - Nasogastric tube if ordered  - Administer ordered antiemetic medications as needed  - Provide nonpharmacologic comfort measures as appropriate  - Advance diet as tolerated, if ordered  - Consider nutrition services referral to assist patient with adequate nutrition and appropriate food choices  Outcome: Progressing  Goal: Maintains or returns to baseline bowel function  Description: INTERVENTIONS:  - Assess bowel function  - Encourage oral fluids to ensure adequate hydration  - Administer IV fluids if ordered to ensure adequate hydration  - Administer ordered medications as needed  - Encourage mobilization and activity  - Consider nutritional services referral to assist patient with adequate nutrition and appropriate food choices  Outcome: Progressing  Goal: Maintains adequate nutritional intake  Description: INTERVENTIONS:  - Monitor percentage of each meal consumed  - Identify factors contributing to decreased intake, treat as appropriate  - Assist with meals as needed  - Monitor I&O, weight, and lab values if indicated  - Obtain nutrition services referral as needed  Outcome: Progressing

## 2021-03-21 NOTE — INCIDENTAL FINDINGS
The following findings require follow up:  Radiographic finding   Findin) 3 5 cm homogeneous right adrenal nodule    Although its imaging features are indeterminate, it does not have suspicious imaging features (heterogeneity, necrosis, irregular margins),  but based on its size, a nonemergent dedicated adrenal protocol CT is recommended to confirm benignity  2) Incidental thyroid nodule(s) for which nonemergent thyroid ultrasound is recommended       Follow up required: As above   Follow up should be done within 3 month(s)    Please notify the following clinician to assist with the follow up:   Dr Nick Lopez

## 2021-03-21 NOTE — ASSESSMENT & PLAN NOTE
Patient noted to have mild aneurysmal ascending thoracic aorta, a right adrenal nodule with recommendation for dedicated adrenal protocol CT scan, and thyroid nodules recommended for further evaluation with nonemergent thyroid ultrasound  Patient was informed of these incidental findings are recommended for follow-up as described with family doctor as well

## 2021-03-21 NOTE — INCIDENTAL FINDINGS
The following findings require follow up:  Radiographic finding   Finding: 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  Fatty changes of the liver  Follow up required: family doctor   Follow up should be done within 2 week(s)    Findings discussed with patient

## 2021-03-21 NOTE — PROGRESS NOTES
[address] [facility]  Progress Note - [address] [facility] 1955, 76 y o  male MRN: [id number]  Unit/Bed#: [facility] 610-01 Encounter: [id number]  Primary Care Provider: Mary Torre DO   Date and time admitted to hospital: 3/20/2021  4:45 PM    Rib pain  Assessment & Plan  - 3/20 CT Slightly bowed appearance to the right anterior 2nd rib without fracture line seen, question old fracture but correlate with site of pain  Ribs are otherwise intact  - Rib fracture protocol  - Pain control        TERTIARY TRAUMA SURVEY NOTE    Prophylaxis: Enoxaparin (Lovenox)    Disposition:  Likely discharge if patient has pain control    Code status:  Level 1 - Full Code    Consultants: None    Is the patient 65 years or older?: YES:    1  Before the illness or injury that brought you to the Emergency, did you need someone to help you on a regular basis? 0=No   2  Since the illness or injury that brought you to the Emergency, have you needed more help than usual to take care of yourself? 0=No   3  Have you been hospitalized for one or more nights during the past 6 months (excluding a stay in the Emergency Department)? 0=No   4  In general, do you see well? 0=Yes   5  In general, do you have serious problems with your memory? 0=No   6  Do you take more than three different medications everyday? 0=No   TOTAL   0     Did you order a geriatric consult if the score was 2 or greater?: n/a    Identification of Seniors at Risk      Most Recent Value   (ISAR) Identification of Seniors at Risk   Before the illness or injury that brought you to the Emergency, did you need someone to help you on a regular basis? 0 Filed at: 03/20/2021 1702   In the last 24 hours, have you needed more help than usual?  0 Filed at: 03/20/2021 1702   Have you been hospitalized for one or more nights during the past 6 months?   0 Filed at: 03/20/2021 1702   In general, do you see well?  0 Filed at: 03/20/2021 1702   In general, do you have serious problems with your memory? 0 Filed at: 03/20/2021 1702   Do you take more than three different medications every day?  0 Filed at: 03/20/2021 1702   ISAR Score  0 Filed at: 03/20/2021 1702            SUBJECTIVE:     Transfer from: [facility] [address]  [employer] Received: not applicable  Tertiary Exam Due on: 3/21    Mechanism of Injury:Fall    Details related to Injury: Helmet:  yes    Chief Complaint:  Mild epigastric abdominal pain    HPI/Last 24 hour events:  No acute events overnight  Patient was brought in after a collision while skiing  Complaining of right-sided chest and abdominal pain  He states that his abdominal pain was very severe overnight but has significantly improved and now he just has a generalized discomfort  No nausea/vomiting  No history of prior in the past   Pulling approximately 2000 cc on incentive spirometry      Active medications:           Current Facility-Administered Medications:     acetaminophen (TYLENOL) tablet 975 mg, 975 mg, Oral, Q8H [address], 975 mg at 03/21/21 0518    enoxaparin (LOVENOX) subcutaneous injection 30 mg, 30 mg, Subcutaneous, Q12H, 30 mg at 03/20/21 1911    gabapentin (NEURONTIN) capsule 300 mg, 300 mg, Oral, HS, 300 mg at 03/20/21 2224    HYDROmorphone (DILAUDID) injection 0 5 mg, 0 5 mg, Intravenous, Q1H PRN, 0 5 mg at 03/21/21 0034    lidocaine (LIDODERM) 5 % patch 1 patch, 1 patch, Topical, Daily    methocarbamol (ROBAXIN) tablet 500 mg, 500 mg, Oral, Q6H [address], 500 mg at 03/21/21 0518    naloxone (NARCAN) 0 04 mg/mL syringe 0 04 mg, 0 04 mg, Intravenous, Q1MIN PRN    nicotine (NICODERM CQ) 14 mg/24hr TD 24 hr patch 1 patch, 1 patch, Transdermal, Daily    ondansetron (ZOFRAN) injection 4 mg, 4 mg, Intravenous, Q6H PRN, 4 mg at 03/20/21 2009    oxyCODONE (ROXICODONE) immediate release tablet 10 mg, 10 mg, Oral, Q4H PRN, 10 mg at 03/20/21 7546    oxyCODONE (ROXICODONE) IR tablet 5 mg, 5 mg, Oral, Q4H PRN    senna (SENOKOT) tablet 17 2 mg, 2 tablet, Oral, Daily      OBJECTIVE:     Vitals:   Vitals:    03/20/21 2251   BP: 139/70   Pulse: 67   Resp: 18   Temp: (!) 97 4 °F (36 3 °C)   SpO2: 95%       Physical Exam:    General: VS reviewed  Appears in NAD  awake, alert  Speaking normally in full sentences  Head: Normocephalic, atraumatic  Eyes: EOM-I  No diplopia  Symmetrical lids  ENT: Atraumatic external nose and ears  MMM  No malocclusion  No stridor  Normal phonation  No drooling  Normal swallowing  Neck: No JVD  CV: No pallor noted  Lungs:   No tachypnea  No respiratory distress  Abdomen is soft, nondistended, no tenderness with palpation  MSK:   FROM spontaneously  Skin: Dry, intact  Neuro: Awake, alert, GCS15, CN II-XII grossly intact  Motor grossly intact  Psychiatric/Behavioral: Appropriate mood and affect   Exam: deferred      I/O:   I/O       03/19 0701 - 03/20 0700 03/20 0701 - 03/21 0700    P  O   480    Total Intake(mL/kg)  480 (5 6)    Urine (mL/kg/hr)  600    Emesis/NG output  0    Total Output  600    Net  -120          Unmeasured Emesis Occurrence  2 x          Invasive Devices: Invasive Devices     Peripheral Intravenous Line            Peripheral IV 03/20/21 Right Antecubital less than 1 day                  Imaging:   Trauma - Ct Head Wo Contrast    Result Date: 3/20/2021  Impression: No acute intracranial abnormality  The study was marked in [facility] for immediate notification  Workstation performed: [id number]     Trauma - Ct Spine Cervical Wo Contrast    Result Date: 3/20/2021  Impression: No cervical spine fracture or traumatic malalignment  Incidental thyroid nodule(s) for which nonemergent thyroid ultrasound is recommended  The study was marked in [facility] for immediate notification  Workstation performed: [id number]     Trauma - Ct Chest Abdomen Pelvis W Contrast    Result Date: 3/20/2021  Impression: 1    Slightly bowed appearance to the right anterior 2nd rib without fracture line seen, question old fracture but correlate with site of pain  Ribs are otherwise intact  2   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  3  3 5 cm homogeneous right adrenal nodule    Although its imaging features are indeterminate, it does not have suspicious imaging features (heterogeneity, necrosis, irregular margins),  but based on its size, a nonemergent dedicated adrenal protocol CT is recommended to confirm benignity  The study was marked in [facility] for immediate notification  Workstation performed: [id number]     Ct Recon Only Thoracolumbar (no Charge)    Result Date: 3/20/2021  Impression: 1  No fracture or traumatic subluxation   Workstation performed: [id number]       Labs:   CBC:   Lab Results   Component Value Date    WBC 8 85 03/21/2021    HGB 13 0 03/21/2021    HCT 37 3 03/21/2021    MCV 97 03/21/2021     03/21/2021    MCH 33 7 03/21/2021    MCHC 34 9 03/21/2021    RDW 12 6 03/21/2021    MPV 9 7 03/21/2021    NRBC 0 03/21/2021     CMP:   Lab Results   Component Value Date     (H) 03/21/2021    CO2 26 03/21/2021    BUN 19 03/21/2021    CREATININE 1 18 03/21/2021    CALCIUM 8 5 03/21/2021    EGFR 64 03/21/2021

## 2021-03-21 NOTE — DISCHARGE SUMMARY
[address] [facility]  Discharge- Niya Ibanez 1955, 76 y o  male MRN: [id number]  Unit/Bed#: [facility] 610-01 Encounter: [id number]  Primary Care Provider: Jt Coello DO   Date and time admitted to hospital: 3/20/2021  4:45 PM    * Rib pain  Assessment & Plan  - 3/20 CT Slightly bowed appearance to the right anterior 2nd rib without fracture line seen, question old fracture but correlate with site of pain  Ribs are otherwise intact  - Rib fracture protocol  - Pain control-adequate on current regimen  -no evidence pneumothorax or hemothorax on CT scan of the chest  -continue encourage pulmonary toileting, patient is saturating in the high 90s on room air  -PT and OT have cleared the patient for discharge home  -discharge home today and follow up with Trauma in 2 weeks    Abnormal finding on CT scan  Assessment & Plan  Patient noted to have mild aneurysmal ascending thoracic aorta, a right adrenal nodule with recommendation for dedicated adrenal protocol CT scan, and thyroid nodules recommended for further evaluation with nonemergent thyroid ultrasound  Patient was informed of these incidental findings are recommended for follow-up as described with family doctor as well  Resolved Problems  Date Reviewed: 3/21/2021    None          Admission Date:   Admission Orders (From admission, onward)     Ordered        03/20/21 1720  Place in Observation  Once                     Admitting Diagnosis: Rib pain [R07 81]    HPI: As documented by Dr Srinivas Cadet who evaluated the patient on admission, "Niya Ibanez is a 76 y o  male with no significant past medical history presents as a transfer from [facility] for intractable pain  Patient was skiing when he was struck by another skier  He was wearing his helmet  Denies any loss of consciousness  He was complaining primarily of right-sided abdominal pain and chest wall pain    CT scan demonstrated slightly bowed appeared to the right anterior 2nd rib without fracture line seen  Given tachypnea and requirement of IV narcotics, patient transferred here for further management and care  He denies any headache, vision changes, neck or back pain  No shortness of breath, abdominal pain, nausea or vomiting  Mechanism:Fall"    Procedures Performed: No orders of the defined types were placed in this encounter  Summary of Hospital Course:  Patient was placed on the trauma service following skiing accident  He was found to have bowing of his right 2nd rib on CT scan of the chest without evidence of fracture line  He did have right chest wall pain and abdominal discomfort as well  CT scans of the head, C-spine and chest abdomen and pelvis showed no other acute traumatic injuries  He was placed on rib fracture protocol and observed overnight  He continued to do well from a respiratory standpoint, requiring no supplemental oxygen and breathing comfortably  He is compliant with use of his incentive spirometer  He tolerated a diet and slept well last night  He has been up and mobile with physical therapy who cleared him for discharge home  Patient was noted to have incidental findings as stated above and he was informed of these prior to discharge  States he is sore in his right chest wall and over his abdomen  He has been tolerating a diet without nausea or vomiting  He denies shortness of breath  He is motivated to go home today      Exam:     Vitals:    03/21/21 0705   BP: 142/82   Pulse: (!) 52   Resp: 16   Temp: 98 6 °F (37 °C)   SpO2: 97%     GEN:  No acute distress  HEENT:  Normocephalic, atraumatic  NEURO:  GCS 15, nonfocal  CV:  Regular rate and rhythm, no murmurs gallops or rubs  PULM:  Clear to auscultation bilaterally  GI:  Soft, nontender, nondistended  :  Voiding  MSK:  Moving all extremities equally with full strength, no edema, contusions or deformity  SKIN:  Pink, warm, dry    Significant Findings, Care, Treatment and Services Provided:   Xr Trauma Chest Portable    Result Date: 3/21/2021  Impression: No evidence of acute pulmonary disease  Top normal heart size with left ventricular configuration  Workstation performed: [id number]     Trauma - Ct Head Wo Contrast    Result Date: 3/20/2021  Impression: No acute intracranial abnormality  The study was marked in [facility] for immediate notification  Workstation performed: [id number]     Trauma - Ct Spine Cervical Wo Contrast    Result Date: 3/20/2021  Impression: No cervical spine fracture or traumatic malalignment  Incidental thyroid nodule(s) for which nonemergent thyroid ultrasound is recommended  The study was marked in [facility] for immediate notification  Workstation performed: [id number]     Trauma - Ct Chest Abdomen Pelvis W Contrast    Result Date: 3/20/2021  Impression: 1  Slightly bowed appearance to the right anterior 2nd rib without fracture line seen, question old fracture but correlate with site of pain  Ribs are otherwise intact  2   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  3  3 5 cm homogeneous right adrenal nodule    Although its imaging features are indeterminate, it does not have suspicious imaging features (heterogeneity, necrosis, irregular margins),  but based on its size, a nonemergent dedicated adrenal protocol CT is recommended to confirm benignity  The study was marked in [facility] for immediate notification  Workstation performed: [id number]     Ct Recon Only Thoracolumbar (no Charge)    Result Date: 3/20/2021  Impression: 1  No fracture or traumatic subluxation  Workstation performed: [id number]       Complications: none    Condition at Discharge: good         Discharge instructions/Information to patient and family:   See after visit summary for information provided to patient and family  Provisions for Follow-Up Care:  See after visit summary for information related to follow-up care and any pertinent home health orders  PCP: Apolinar Thompson DO    Disposition: Home    Planned Readmission: No    Discharge Statement   I spent 25 minutes discharging the patient  This time was spent on the day of discharge  I had direct contact with the patient on the day of discharge  Additional documentation is required if more than 30 minutes were spent on discharge  Discharge Medications:  See after visit summary for reconciled discharge medications provided to patient and family  No

## 2021-03-21 NOTE — ASSESSMENT & PLAN NOTE
- 3/20 CT Slightly bowed appearance to the right anterior 2nd rib without fracture line seen, question old fracture but correlate with site of pain  Ribs are otherwise intact    - Rib fracture protocol  - Pain control-adequate on current regimen  -no evidence pneumothorax or hemothorax on CT scan of the chest  -continue encourage pulmonary toileting, patient is saturating in the high 90s on room air  -PT and OT have cleared the patient for discharge home  -discharge home today and follow up with Trauma in 2 weeks

## 2021-03-21 NOTE — UTILIZATION REVIEW
Initial Clinical Review    Admission: Date/Time/Statement:   Admission Orders (From admission, onward)     Ordered        03/20/21 1720  Place in Observation  Once                   Orders Placed This Encounter   Procedures    Place in Observation     Standing Status:   Standing     Number of Occurrences:   1     Order Specific Question:   Level of Care     Answer:   Med Surg [16]     ED Arrival Information     Expected Arrival Acuity Means of Arrival Escorted By Service Admission Type    3/20/2021  3/20/2021 16:44 [facility] [address] [facility] Trauma Urgent    Arrival Complaint    rib pain        Chief Complaint   Patient presents with    Trauma     Trauma transfer from [facility] [facility] - skiing accident, collided with another skiier, R side impact  -LOC, pain uncontrolled at previous facility    Skiing Accident     Assessment/Plan:  76 y o  male with no significant PMHx presents to [facility] ED as a transfer from [facility] for intractable pain  Patient was skiing when he was struck by another skier  He was wearing his helmet  Denies LOC  He was c/o of right-sided abdominal pain and chest wall pain  CT scan demonstrated slightly bowed appeared to the right anterior 2nd rib without fracture line seen  Given tachypnea and requirement of IV narcotics, patient transferred to [facility] for further management and care  Plan to admit observation to M/S unit under trauma service -- analgesics prn  Incentive spirometry  3/21 -- He states that his abdominal pain was very severe overnight but has significantly improved and now he just has a generalized discomfort  Doing incentive spirometry to 2000 cc  Continue analgesics prn  Incentive spirometry  OOB  SCD's   Reg diet    ED Triage Vitals [03/20/21 1655]   Temperature Pulse Respirations Blood Pressure SpO2   97 9 °F (36 6 °C) 71 20 159/90 98 %      Temp Source Heart Rate Source Patient Position - Orthostatic VS BP Location FiO2 (%)   Oral Monitor Lying -- -- Pain Score       8          Wt Readings from Last 1 Encounters:   03/20/21 86 2 kg (190 lb)     Additional Vital Signs:   Date/Time  Temp  Pulse  Resp  BP  MAP (mmHg)  SpO2  O2 Device  Patient Position - Orthostatic VS   03/21/21 0727  --  --  --  --  --  --  None (Room air)  --   03/21/21 07:05:47  98 6 °F (37 °C)  52Abnormal   16  142/82  102  97 %  --  --   03/20/21 22:51:40  97 4 °F (36 3 °C)Abnormal   67  18  139/70  93  95 %  --  --   03/20/21 1935  --  --  --  --  --  --  None (Room air)  --   03/20/21 19:06:29  98 2 °F (36 8 °C)  70  22  150/76  101  98 %  --  --   03/20/21 1755  --  64  20  155/86  --  97 %  --  Lying   03/20/21 1655  97 9 °F (36 6 °C)  71  20  159/90  --  98 %  --  Lying       Pertinent Labs/Diagnostic Test Results:   CT c/s/p 3/20 -- 1   Slightly bowed appearance to the right anterior 2nd rib without fracture line seen, question old fracture but correlate with site of pain   Ribs are otherwise intact  2  Marylouise Red Banks aneurysmal ascending thoracic aorta at 4 2 cm    3  3 5 cm homogeneous right adrenal nodule    Although its imaging features are indeterminate, it does not have suspicious imaging features (heterogeneity, necrosis, irregular margins),  but based on its size, a nonemergent dedicated adrenal protocol CT is recommended to confirm benignity  CT head 3/20 -- No acute intracranial abnormality  CT c-spine 3/20 -- No cervical spine fracture or traumatic malalignment  Incidental thyroid nodule(s) for which nonemergent thyroid ultrasound is recommended  CT thoracolumbar 3/20 -- 1   No fracture or traumatic subluxation         Results from last 7 days   Lab Units 03/21/21  0511 03/20/21  1301   WBC Thousand/uL 8 85 9 40   HEMOGLOBIN g/dL 13 0 14 1   HEMATOCRIT % 37 3 40 0*   PLATELETS Thousands/uL 200 205   NEUTROS ABS Thousands/µL 6 69 7 20*     Results from last 7 days   Lab Units 03/21/21  0511 03/20/21  1301   SODIUM mmol/L 140 137   POTASSIUM mmol/L 3 3* 3 7   CHLORIDE mmol/L 110* 102   CO2 mmol/L 26 22   ANION GAP mmol/L 4 13   BUN mg/dL 19 21   CREATININE mg/dL 1 18 1 48*   EGFR ml/min/1 73sq m 64 49   CALCIUM mg/dL 8 5 8 8     Results from last 7 days   Lab Units 03/21/21  0511 03/20/21  1301   GLUCOSE RANDOM mg/dL 106 166*     Results from last 7 days   Lab Units 03/20/21  1300   TROPONIN I ng/mL <0 03     Results from last 7 days   Lab Units 03/20/21  1300   PTT seconds 25           ED Treatment:   Medication Administration from 03/20/2021 1642 to 03/20/2021 1859       Date/Time Order Dose Route Action     03/20/2021 1741 acetaminophen (TYLENOL) tablet 975 mg 975 mg Oral Given     03/20/2021 1742 methocarbamol (ROBAXIN) tablet 500 mg 500 mg Oral Given     Past Medical History:   Diagnosis Date    Ruptured, tendon, Achilles     Shoulder injury      Present on Admission:   Rib pain      Admitting Diagnosis: Rib pain [R07 81]  Age/Sex: 76 y o  male  Admission Orders:  Scheduled Medications:  acetaminophen, 975 mg, Oral, Q8H [address]  enoxaparin, 30 mg, Subcutaneous, Q12H  gabapentin, 300 mg, Oral, HS  lidocaine, 1 patch, Topical, Daily  methocarbamol, 500 mg, Oral, Q6H [address]  nicotine, 1 patch, Transdermal, Daily  senna, 2 tablet, Oral, Daily    PRN Meds:  HYDROmorphone, 0 5 mg, Intravenous, Q1H PRN 3/20 x1, 3/21 x1  naloxone, 0 04 mg, Intravenous, Q1MIN PRN  ondansetron, 4 mg, Intravenous, Q6H PRN 3/20 x1  oxyCODONE, 10 mg, Oral, Q4H PRN 3/20 x2  oxyCODONE, 5 mg, Oral, Q4H PRN        Network Utilization Review Department  ATTENTION: Please call with any questions or concerns to [phone] and carefully listen to the prompts so that you are directed to the right person  All voicemails are confidential   Winifred Fall all requests for admission clinical reviews, approved or denied determinations and any other requests to dedicated fax number below belonging to the campus where the patient is receiving treatment   List of dedicated fax numbers for the Facilities:  [facility] [place] ADMISSION DENIALS (Administrative/Medical Necessity) [phone]   [address] (Maternity/NICU/Pediatrics) [address] [facility] 40 [address] [facility]  [phone]   Ortega Lange 7237 (  Fermin Epstein "Maria L" 103) [address] [address] [place] [id number] [phone]   [place] [address] [phone]

## 2021-03-21 NOTE — OCCUPATIONAL THERAPY NOTE
Occupational Therapy Evaluation     Patient Name: Darius Briceno  QIMWA'D Date: 3/21/2021  Problem List  Principal Problem:    Rib pain  Active Problems:    Abnormal finding on CT scan    Past Medical History  Past Medical History:   Diagnosis Date    Ruptured, tendon, Achilles     Shoulder injury      Past Surgical History  Past Surgical History:   Procedure Laterality Date    ACHILLES TENDON REPAIR      THYROIDECTOMY, PARTIAL             03/21/21 0955   OT Last Visit   OT Visit Date 03/21/21   Note Type   Note type Evaluation   Restrictions/Precautions   Weight Bearing Precautions Per Order No   Other Precautions Pain   Pain Assessment   Pain Assessment Tool FLACC   Pain Location/Orientation Location: Rib Cage   Hospital Pain Intervention(s) Repositioned; Ambulation/increased activity; Emotional support   Pain Rating: FLACC (Rest) - Face 0   Pain Rating: FLACC (Rest) - Legs 0   Pain Rating: FLACC (Rest) - Activity 0   Pain Rating: FLACC (Rest) - Cry 0   Pain Rating: FLACC (Rest) - Consolability 0   Score: FLACC (Rest) 0   Pain Rating: FLACC (Activity) - Face 1   Pain Rating: FLACC (Activity) - Legs 0   Pain Rating: FLACC (Activity) - Activity 0   Pain Rating: FLACC (Activity) - Cry 1   Pain Rating: FLACC (Activity) - Consolability 0   Score: FLACC (Activity) 2   Home Living   Type of [address] [place] [address] One level;Stairs to enter with rails   Bathroom Equipment   (denies)   Home Equipment   (denies)   Additional Comments Pt reports living in a 1 story home with 1 ROMERO  Prior Function   Level of [place] Independent with ADLs and functional mobility   Lives With Spouse   Receives Help From Family   ADL Assistance Independent   IADLs Independent   Falls in the last 6 months 0   Vocational Retired   Lifestyle   Autonomy Pt reports being I with ADLS, IADLS and mobility without device PTA  Reciprocal Relationships Pt lives with his wife who he reports is able to assist as needed upon d/c  Service to Others Retired   Intrinsic Gratification Enjoys skiing    Subjective   Subjective Pt reports that he has no concerns about returning home  ADL   Where Assessed Edge of bed   Eating Assistance 7  Independent   Grooming Assistance 5  Supervision/Setup   UB Bathing Assistance 5  Supervision/Setup   LB Bathing Assistance 5  Supervision/Setup   UB Dressing Assistance 5  Supervision/Setup   LB Dressing Assistance 5  Supervision/Setup   Toileting Assistance  5  Supervision/Setup   Bed Mobility   Supine to Sit 6  Modified independent   Sit to Supine 6  Modified independent   Transfers   Sit to Stand 5  Supervision   Stand to Sit 5  Supervision   Functional Mobility   Functional Mobility 5  Supervision   Additional Comments Pt demonstrated household mobility with no device or rest breaks  Balance   Static Sitting Good   Dynamic Sitting Fair +   Static Standing Fair +   Dynamic Standing Fair   Ambulatory Fair   Activity Tolerance   Activity Tolerance Patient tolerated treatment well   Medical Staff Made Aware PT Khloe   Nurse Made Aware RN confirmed okay to see pt   RUE Assessment   RUE Assessment WFL   LUE Assessment   LUE Assessment WFL   Cognition   Overall Cognitive Status WFL   Arousal/Participation Alert; Cooperative   Attention Within functional limits   Orientation Level Oriented X4   Memory Within functional limits   Following Commands Follows all commands and directions without difficulty   Comments Pt is very pleasant and cooperative to work with therapy  Assessment   Assessment Pt is a 76 y o  male admitted to B on 3/20/2021 w/ rib pain after a fall skiing  Comorbidities include a h/o Ruptured, tendon, Achilles and Shoulder injury  Pt with active OT orders and up in chair orders  Pt resides in a 1 story home with 1 ROMERO  Pt lives with his wife who is able to assist as needed upon d/c  Pt was I w/  ADLS and IADLS, (+) drove, & required no use of DME PTA   Currently pt is supervision for functional transfers, functional mobility and ADLS overall  Based on the aforementioned OT evaluation, functional performance deficits, and assessments, pt has been identified as a moderate complexity evaluation  From OT standpoint, anticipate d/c home with family support  Recommend continued participation in [address] [facility] and functional mobility with staff  No further acute OT needs, d/c OT      Goals   Patient Goals To return home today   Recommendation   OT Discharge Recommendation Return to previous environment with social support   OT - OK to Discharge Yes  (When medically appropriate)   AM-PAC Daily Activity Inpatient   Lower Body Dressing 4   Bathing 4   Toileting 4   Upper Body Dressing 4   Grooming 4   Eating 4   Daily Activity Raw Score 24   Daily Activity Standardized Score (Calc for Raw Score >=11) 57 54   AM-PAC Applied Cognition Inpatient   Following a Speech/Presentation 4   Understanding Ordinary Conversation 4   Taking Medications 4   Remembering Where Things Are Placed or Put Away 4   Remembering List of 4-5 Errands 4   Taking Care of Complicated Tasks 4   Applied Cognition Raw Score 24   Applied Cognition Standardized Score 62 21   Modified Alma Scale   Modified Alma Scale 2       Shanua Cadena, MOT, OTR/L

## 2021-04-08 ENCOUNTER — OFFICE VISIT (OUTPATIENT)
Dept: SURGERY | Facility: CLINIC | Age: 66
End: 2021-04-08
Payer: COMMERCIAL

## 2021-04-08 VITALS — TEMPERATURE: 97.7 F | BODY MASS INDEX: 29.33 KG/M2 | HEIGHT: 69 IN | WEIGHT: 198 LBS

## 2021-04-08 DIAGNOSIS — R07.81 RIB PAIN: Primary | ICD-10-CM

## 2021-04-08 PROCEDURE — 99212 OFFICE O/P EST SF 10 MIN: CPT | Performed by: SURGERY

## 2021-04-08 NOTE — ASSESSMENT & PLAN NOTE
- possible R second rib fracture seen on CT chest  - pain is improving  - continue tylenol and ibuprofen as needed  - continue light activity without heavy lifting for the next 2-3 week and then slow re-introduction of activity  - f/u with trauma as needed

## 2021-04-08 NOTE — PROGRESS NOTES
Office Visit - General Surgery  Joanna Poole MRN: [id number]  Encounter: [id number]    Assessment and Plan    Problem List Items Addressed This Visit        Other    Rib pain - Primary     - possible R second rib fracture seen on CT chest  - pain is improving  - continue tylenol and ibuprofen as needed  - continue light activity without heavy lifting for the next 2-3 week and then slow re-introduction of activity  - f/u with trauma as needed               Chief Complaint:  Joanna Poole is a 76 y o  male [facility] [address] [facility] (f/u rib pain  1/10 on a scale of 1 to 10  taking tylenol )    Subjective  77 y/o male s/p skiing accident on 3/20 sustaining R rib pain, and possible R 2nd rib bowing noted on CT scan  He is feeling much better  His pain is much improved and tolerable on an occasional ibuprofen and tylenol  He has no shortness of breath, dyspnea with exertion or other complaints  He has been going for walks every day  He is curious about when he should start increasing activity       Past Medical History  Past Medical History:   Diagnosis Date    Ruptured, tendon, Achilles     Shoulder injury        Past Surgical History  Past Surgical History:   Procedure Laterality Date    ACHILLES TENDON REPAIR      THYROIDECTOMY, PARTIAL         Family History  Family History   Problem Relation Age of Onset    No Known Problems Mother     No Known Problems Father        Social History  Social History     Socioeconomic History    Marital status: /Civil Union     Spouse name: None    Number of children: None    Years of education: None    Highest education level: None   Occupational History    None   Social Needs    Financial resource strain: None    Food insecurity     Worry: None     Inability: None    Transportation needs     Medical: None     Non-medical: None   Tobacco Use    Smoking status: Current Some Day Smoker    Smokeless tobacco: Never Used   Substance and Sexual Activity  Alcohol use: Yes    Drug use: Never    Sexual activity: None   Lifestyle    Physical activity     Days per week: None     Minutes per session: None    Stress: None   Relationships    Social connections     Talks on phone: None     Gets together: None     Attends [employer] service: None     Active member of club or organization: None     Attends meetings of clubs or organizations: None     Relationship status: None    Intimate partner violence     Fear of current or ex partner: None     Emotionally abused: None     Physically abused: None     Forced sexual activity: None   Other Topics Concern    None   Social History Narrative    None        Medications  Current Outpatient Medications on File Prior to Visit   Medication Sig Dispense Refill    acetaminophen (TYLENOL) 325 mg tablet Take 3 tablets (975 mg total) by mouth every 8 (eight) hours  0    docusate sodium (COLACE) 100 mg capsule Take 1 capsule (100 mg total) by mouth 2 (two) times a day 10 capsule 0    gabapentin (NEURONTIN) 300 mg capsule Take 1 capsule (300 mg total) by mouth daily at bedtime (Patient not taking: Reported on 4/8/2021) 30 capsule 0    methocarbamol (ROBAXIN) 500 mg tablet Take 1 tablet (500 mg total) by mouth every 6 (six) hours (Patient not taking: Reported on 4/8/2021) 45 tablet 0    oxyCODONE (ROXICODONE) 5 mg immediate release tablet 5 mg to 10 mg p o  Every 4 hours as needed for moderate to severe pain (Patient not taking: Reported on 4/8/2021) 30 tablet 0    senna (SENOKOT) 8 6 mg Take 2 tablets (17 2 mg total) by mouth daily (Patient not taking: Reported on 4/8/2021)  0     No current facility-administered medications on file prior to visit  Allergies  No Known Allergies    Review of Systems   Constitutional: Negative  HENT: Negative  Respiratory: Negative  Negative for chest tightness and shortness of breath  Cardiovascular: Negative  Gastrointestinal: Negative      Musculoskeletal:        + anterior chest wall tenderness centrally   Skin: Negative  Neurological: Negative  Negative for headaches  Hematological: Negative  Psychiatric/Behavioral: Negative  Objective  Vitals:    04/08/21 1318   Temp: 97 7 °F (36 5 °C)       Physical Exam  Constitutional:       Appearance: Normal appearance  HENT:      Head: Normocephalic  Nose: Nose normal       Mouth/Throat:      Mouth: Mucous membranes are moist    Cardiovascular:      Rate and Rhythm: Normal rate and regular rhythm  Pulses: Normal pulses  Pulmonary:      Effort: Pulmonary effort is normal  No respiratory distress  Breath sounds: Normal breath sounds  Comments: + oxygen saturation 98% on room air  Abdominal:      General: Abdomen is flat  There is no distension  Palpations: Abdomen is soft  Tenderness: There is no abdominal tenderness  Musculoskeletal:      Comments: + sternal and right anterior chest wall tenderness to palpation   Skin:     General: Skin is warm and dry  Capillary Refill: Capillary refill takes less than 2 seconds  Neurological:      General: No focal deficit present  Mental Status: He is alert and oriented to person, place, and time  Sensory: No sensory deficit  Motor: No weakness  Psychiatric:         Mood and Affect: Mood normal          Thought Content:  Thought content normal          Judgment: Judgment normal

## 2022-06-09 NOTE — DISCHARGE INSTRUCTIONS
Seek medical attention if you develop worsening chest pain or shortness of breath, dizziness/lightheadness, fevers/chills or sweats  No heavy lifting, pushing or pulling >10 pounds and no strenuous physical activity until cleared by trauma  No work or driving while taking narcotic pain medications and until cleared by trauma  Use your incentive spirometer at least hourly while awake  home